# Patient Record
(demographics unavailable — no encounter records)

---

## 2024-10-08 NOTE — WORK
[Sprain/Strain] : sprain/strain [Was the competent medical cause of the injury] : was the competent medical cause of the injury [Are consistent with the injury] : are consistent with the injury [Consistent with my objective findings] : consistent with my objective findings [Mild Partial] : mild partial [Reveals pre-existing condition(s) that may affect treatment/prognosis] : reveals pre-existing condition(s) that may affect treatment/prognosis [Can return to work without limitations on ______] : can return to work without limitations on [unfilled] [N/A] : : Not Applicable [Patient] : patient [No] : No [No Rx restrictions] : No Rx restrictions. [I provided the services listed above] :  I provided the services listed above. [FreeTextEntry1] : good [FreeTextEntry2] : lumbar ddd [Medium Work:] : Medium Work: Exerting 20 to 50 pounds of force occasionally, and/or 10 to 25 pounds of force frequently, and/or greater than negligible up to 10 pounds of force constantly to move objects. Physical demand requirements are in excess of those for Light Work

## 2024-10-08 NOTE — PHYSICAL EXAM
[Flexion] : flexion [Extension] : extension [FreeTextEntry8] : improved [Right] : right knee [NL (0)] : extension 0 degrees [5___] : hamstring 5[unfilled]/5 [] : negative Lachmann [Negative] : negative Heidy's [TWNoteComboBox7] : flexion 130 degrees

## 2024-10-08 NOTE — REASON FOR VISIT
[FreeTextEntry2] : 10/8/24  here for follow up for her neck and lower back pain. PT continues to help.  Stiffness and soreness. She is working. Pain in the knee is stable. 07/09/2024 Finishing PT, doing better 05/21/2024 has been doing PT for her neck, just authorized to do it for her low back as well 04/09/2024 Doing PT for neck, would like to do for her low back as well. Right knee pain resolved

## 2024-10-08 NOTE — HISTORY OF PRESENT ILLNESS
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 Landed hard on right knee, and mp right side of body. Has some neck and back soreness. Has had back issues in the past. Works as   [] : no [FreeTextEntry1] : right knee/right side  [FreeTextEntry3] : 3/7/24 [FreeTextEntry5] : patient was injured at work

## 2024-10-08 NOTE — ASSESSMENT
[FreeTextEntry1] : Continue PT Trial acupuncture [facility] spine conditioning handout provided Return 6 weeks for re-evaluation

## 2024-12-27 NOTE — REVIEW OF SYSTEMS
[Nasal Discharge] : nasal discharge [Sore Throat] : sore throat [Postnasal Drip] : postnasal drip [Shortness Of Breath] : no shortness of breath [Wheezing] : no wheezing [Cough] : cough [Negative] : Heme/Lymph

## 2024-12-27 NOTE — ASSESSMENT
[FreeTextEntry1] : Patient is a 67yo female who presents to the office complaining of URI symptoms, deep productive cough that began a few days ago.  Discussed with pt symptoms are likely viral.  RX for Bromphed sent to pharmacy.  Encouraged supportive care.  Pt is traveling to [place] on Sunday for vacation.  Pt was provided RX for Doxycycline to bring with her in case symptoms persist, worsen, or she starts spiking fevers.  Advised to hold abx unless above happens.  Call the office or go to the ED immediately if you develop new, worsening or concerning symptoms including high fever, severe headache/worst headache of your life, confusion, dizziness/lightheadedness, loss of consciousness, severe chest pain, difficulty breathing, shortness of breath, severe abdominal pain, excessive vomiting/diarrhea, inability to feel/move the extremities, or any other concerning symptoms.

## 2024-12-27 NOTE — PHYSICAL EXAM
[Normal Outer Ear/Nose] : the outer ears and nose were normal in appearance [No Edema] : there was no peripheral edema [Normal] : no rash [Coordination Grossly Intact] : coordination grossly intact [No Focal Deficits] : no focal deficits [Normal Gait] : normal gait [Normal Affect] : the affect was normal [Normal Insight/Judgement] : insight and judgment were intact [de-identified] : fluid behind bilateral TMs; nasal mucosa edematous/erythematous with cloudy drainage; PND, mild erythema, no exudates.

## 2024-12-27 NOTE — HISTORY OF PRESENT ILLNESS
[FreeTextEntry8] : Pt reports URI symptoms for the past few days. Last night started with very deep cough. Cough is wet and productive of phlegm. States she has mild soreness in the back from coughing. Felt body aches, feverish this morning but temp was 98.2F at home. Has mild sinus pressure and PND. Has been taking Advil and Mucinex for symptoms. COVID negative at home. No known sick contacts. No prior history of PNA or lung disease. Received PCV20 in 09/2024.

## 2025-02-27 NOTE — PHYSICAL EXAM
[Appropriately responsive] : appropriately responsive [Alert] : alert [No Acute Distress] : no acute distress [No Murmurs] : no murmurs [Soft] : soft [Non-tender] : non-tender [Non-distended] : non-distended [No HSM] : No HSM [No Lesions] : no lesions [No Mass] : no mass [Oriented x3] : oriented x3 [Examination Of The Breasts] : a normal appearance [No Masses] : no breast masses were palpable [Labia Majora] : normal [Labia Minora] : normal [Normal] : normal [Uterine Adnexae] : non-palpable [No Tenderness] : no tenderness [Tenderness] : nontender [Enlarged ___ wks] : not enlarged [Mass ___ cm] : no uterine mass was palpated [FreeTextEntry5] : pap done [FreeTextEntry9] : guaiac-

## 2025-02-27 NOTE — HISTORY OF PRESENT ILLNESS
[Patient reported colonoscopy was normal] : Patient reported colonoscopy was normal [Yes] : Patient has concerns regarding sex [Currently Active] : currently active [Men] : men [Vaginal] : vaginal [TextBox_4] : No vb,  tried using estring last yr for pain with intercourse but had difficulty removing ring due to arthritis. Taking vit d and exercising  on actonel since 8/2023 and tolerating well, she requests monthly instead of weekly dosing.  [Mammogramdate] : 4/2024 [PapSmeardate] : 2/2023 [BoneDensityDate] : 7/2023 [ColonoscopyDate] : 12/2021 [FreeTextEntry1] : dryness, pain

## 2025-03-28 NOTE — HEALTH RISK ASSESSMENT
[No falls in past year] : Patient reported no falls in the past year [0] : 2) Feeling down, depressed, or hopeless: Not at all (0) [PHQ-2 Negative - No further assessment needed] : PHQ-2 Negative - No further assessment needed [Never] : Never [NVV2Tbhan] : 0

## 2025-03-28 NOTE — HISTORY OF PRESENT ILLNESS
[FreeTextEntry8] : Patient presents with URI symptoms. These include nasal congestion, sinus pressure, and a slight cough. She had a scatchy throat and feels postnasal drip. She denies a fever. She has sick contacts at work.  Symptoms started last weekend but thought it was allergies.  She has been using Flonase, Mucinex, and Advil. She does not tolerate decongestants.

## 2025-03-28 NOTE — PLAN
[FreeTextEntry1] : The majority of sinus infections are caused by viruses and respond to supportive care and do not require use of antibiotics. Recommend treating sinus symptoms with OTC medications including decongestants (Sudafed or Coricidin), mucolytics (Mucinex or Robitussin), nasal saline spray or Neti pot, and Tylenol or ibuprofen for pain and fever.   Also recommend use of nasal steroid sprays (i.e. Flonase). Afrin (OTC decongestant spray) used SPARINGLY (not for more than 2-3 days in a row) can help decrease nasal congestion and help sinuses to drain.    Will prescribe antibiotics at this time as well due to duration of symptoms.

## 2025-03-28 NOTE — ASSESSMENT
[FreeTextEntry1] : Her sympotms are consistent with acute bacterial sinusitis triggered by allergies or a viral URI.